# Patient Record
Sex: MALE | Race: WHITE | Employment: OTHER | URBAN - METROPOLITAN AREA
[De-identification: names, ages, dates, MRNs, and addresses within clinical notes are randomized per-mention and may not be internally consistent; named-entity substitution may affect disease eponyms.]

---

## 2017-05-03 ENCOUNTER — TRANSFERRED RECORDS (OUTPATIENT)
Dept: HEALTH INFORMATION MANAGEMENT | Facility: CLINIC | Age: 72
End: 2017-05-03

## 2017-05-08 DIAGNOSIS — I10 ESSENTIAL HYPERTENSION WITH GOAL BLOOD PRESSURE LESS THAN 130/80: ICD-10-CM

## 2017-05-08 DIAGNOSIS — I25.10 CORONARY ARTERY DISEASE INVOLVING NATIVE CORONARY ARTERY OF NATIVE HEART WITHOUT ANGINA PECTORIS: ICD-10-CM

## 2017-05-08 NOTE — TELEPHONE ENCOUNTER
Called and left  for [facility] pharmacy. Patient already has 90 day supply with 3 refills sent on 9/26/16

## 2017-08-21 ENCOUNTER — TRANSFERRED RECORDS (OUTPATIENT)
Dept: HEALTH INFORMATION MANAGEMENT | Facility: CLINIC | Age: 72
End: 2017-08-21

## 2017-08-21 LAB
CHOL/HDLC RATIO - QUEST: 3.5
CHOLEST SERPL-MCNC: 128 MG/DL
HBA1C MFR BLD: 5.7 % (ref 0–5.7)
HDLC SERPL-MCNC: 37 MG/DL
LDLC SERPL CALC-MCNC: 73 MG/DL
TRIGL SERPL-MCNC: 90 MG/DL

## 2017-08-22 ENCOUNTER — TRANSFERRED RECORDS (OUTPATIENT)
Dept: HEALTH INFORMATION MANAGEMENT | Facility: CLINIC | Age: 72
End: 2017-08-22

## 2017-08-22 DIAGNOSIS — E78.2 MIXED HYPERLIPIDEMIA: Primary | ICD-10-CM

## 2017-08-22 DIAGNOSIS — I10 HTN (HYPERTENSION): ICD-10-CM

## 2017-08-23 ENCOUNTER — OFFICE VISIT (OUTPATIENT)
Dept: CARDIOLOGY | Facility: CLINIC | Age: 72
End: 2017-08-23
Attending: INTERNAL MEDICINE
Payer: COMMERCIAL

## 2017-08-23 VITALS
SYSTOLIC BLOOD PRESSURE: 138 MMHG | DIASTOLIC BLOOD PRESSURE: 68 MMHG | HEIGHT: 71 IN | HEART RATE: 64 BPM | WEIGHT: 185.4 LBS | BODY MASS INDEX: 25.96 KG/M2

## 2017-08-23 DIAGNOSIS — I25.10 CORONARY ARTERY DISEASE INVOLVING NATIVE CORONARY ARTERY OF NATIVE HEART WITHOUT ANGINA PECTORIS: ICD-10-CM

## 2017-08-23 DIAGNOSIS — I10 ESSENTIAL HYPERTENSION WITH GOAL BLOOD PRESSURE LESS THAN 130/80: ICD-10-CM

## 2017-08-23 DIAGNOSIS — E78.2 MIXED HYPERLIPIDEMIA: ICD-10-CM

## 2017-08-23 PROCEDURE — 99214 OFFICE O/P EST MOD 30 MIN: CPT | Performed by: INTERNAL MEDICINE

## 2017-08-23 NOTE — PROGRESS NOTES
HPI and Plan:   See dictation    IMPRESSION:   1.  Asymptomatic coronary artery disease.   2.  Controlled hypertension.   3.  Treated hyperlipidemia.   4.  Mild hypertriglyceridemia.   5.  Vague abdominal discomfort likely unrelated to any possibility of having an abdominal aneurysm but an ultrasound ordered.   6.  Mild peripheral vascular arterial disease distally.  I did not order any assessment to this as he is otherwise asymptomatic.           I recommend continuing current treatment plans in the chart.      No orders of the defined types were placed in this encounter.    Orders Placed This Encounter   Medications     ranitidine (ZANTAC) 150 MG tablet     Sig: Take by mouth daily as needed for heartburn     echincea extract capsule     Sig: Take 250 mg by mouth daily as needed     Medications Discontinued During This Encounter   Medication Reason     sildenafil (VIAGRA) 100 MG tablet Stopped by Patient         Encounter Diagnoses   Name Primary?     Coronary artery disease involving native coronary artery of native heart without angina pectoris      Essential hypertension with goal blood pressure less than 130/80      Mixed hyperlipidemia        CURRENT MEDICATIONS:  Current Outpatient Prescriptions   Medication Sig Dispense Refill     ranitidine (ZANTAC) 150 MG tablet Take by mouth daily as needed for heartburn       echincea extract capsule Take 250 mg by mouth daily as needed       rosuvastatin (CRESTOR) 10 MG tablet Take 0.5 tablets (5 mg) by mouth every other day 23 tablet 3     valsartan (DIOVAN) 80 MG tablet Take 1 tablet (80 mg) by mouth daily 90 tablet 3     metoprolol (LOPRESSOR) 50 MG tablet Take 1 tablet (50 mg) by mouth every evening 90 tablet 3     aspirin 81 MG tablet Take 81 mg by mouth every other day       magnesium 100 MG TABS Take 1 tablet by mouth daily       Esomeprazole magnesium (NEXIUM) 20 MG PACK Take 1 tablet by mouth daily as needed        Vitamin D, Cholecalciferol, 1000 UNITS CAPS  Take 1 tablet by mouth daily       fluticasone (FLONASE) 50 MCG/ACT nasal spray daily as needed   11     Omega-3 Fatty Acids (FISH OIL) 1200 MG CAPS Take by mouth daily          ALLERGIES   No Known Allergies    PAST MEDICAL HISTORY:  Past Medical History:   Diagnosis Date     Coronary artery disease      Gastro-oesophageal reflux disease      Hiatal hernia      History of angina      Hyperlipidaemia      Hypertension      Reflux        PAST SURGICAL HISTORY:  Past Surgical History:   Procedure Laterality Date     CORONARY ANGIOGRAPHY ADULT ORDER  1999     HEART CATH, ANGIOPLASTY  1999    Ptca/Stent LAD, d CX     nasal polpectomy         FAMILY HISTORY:  Family History   Problem Relation Age of Onset     Other Cancer Mother      asbestos related cancer     Other Cancer Father      asbestos related cancer     Coronary Artery Disease No family hx of        SOCIAL HISTORY:  Social History     Social History     Marital status:      Spouse name: N/A     Number of children: N/A     Years of education: N/A     Social History Main Topics     Smoking status: Former Smoker     Packs/day: 0.50     Years: 15.00     Types: Cigarettes     Smokeless tobacco: Never Used      Comment: quit 1978     Alcohol use Yes      Comment: 1 per day      Drug use: No     Sexual activity: Not Asked     Other Topics Concern     Parent/Sibling W/ Cabg, Mi Or Angioplasty Before 65f 55m? No     Caffeine Concern Yes     coffee in the morning      Special Diet No     Exercise Yes     mod.     Social History Narrative         Review of Systems:  Skin:  Negative       Eyes:  Negative      ENT:  Negative      Respiratory:  Negative       Cardiovascular:  Negative;palpitations;chest pain;lightheadedness;dizziness;cyanosis;syncope or near-syncope;exercise intolerance;fatigue;edema      Gastroenterology: Positive for heartburn;reflux    Genitourinary:  Positive for nocturia    Musculoskeletal:  Positive for joint pain    Neurologic:  Negative     "  Psychiatric:  Positive for sleep disturbances    Heme/Lymph/Imm:  Positive for easy bruising    Endocrine:  Negative        Physical Exam:  Vitals: /68 (BP Location: Left arm, Cuff Size: Adult Regular)  Pulse 64  Ht 1.803 m (5' 11\")  Wt 84.1 kg (185 lb 6.4 oz)  BMI 25.86 kg/m2    Constitutional:  cooperative;alert and oriented;well developed;well nourished overweight      Skin:  warm and dry to the touch        Head:  normocephalic        Eyes:  pupils equal and round;sclera white        ENT:  no pallor or cyanosis        Neck:  carotid pulses are full and equal bilaterally;no carotid bruit        Chest:  clear to auscultation          Cardiac: regular rhythm;normal S1 and S2;no murmurs, gallops or rubs detected                  Abdomen:  abdomen soft;no masses;non-tender   no Aorta felt,    Vascular: pulses full and equal                                        Extremities and Back:  no deformities, clubbing, cyanosis, erythema observed              Neurological:  affect appropriate, oriented to time, person and place;affect appropriate          Recent Lab Results:  LIPID RESULTS:  Lab Results   Component Value Date    HDL 54 05/22/2013    LDL 98 05/22/2013    TRIG 114 05/22/2013    CHOLHDLRATIO 3.2 05/22/2013       LIVER ENZYME RESULTS:  Lab Results   Component Value Date    ALT 19 12/13/2010       CBC RESULTS:  No results found for: WBC, RBC, HGB, HCT, MCV, MCH, MCHC, RDW, PLT    BMP RESULTS:  No results found for: NA, POTASSIUM, CHLORIDE, CO2, ANIONGAP, GLC, BUN, CR, GFRESTIMATED, GFRESTBLACK, SOPHIE     A1C RESULTS:  No results found for: A1C    INR RESULTS:  No results found for: INR        CC  Nasir Ashley MD  [address]  [place] [address]                  "

## 2017-08-23 NOTE — MR AVS SNAPSHOT
"              After Visit Summary   8/23/2017    Javi Beatty    MRN: [id number]           Patient Information     Date Of Birth          1945        Visit Information        Provider Department      8/23/2017 9:45 AM Nasir Ashley MD [facility]        Today's Diagnoses     Coronary artery disease involving native coronary artery of native heart without angina pectoris        Essential hypertension with goal blood pressure less than 130/80        Mixed hyperlipidemia           Follow-ups after your visit        Additional Services     Follow-Up with Cardiologist                 Future tests that were ordered for you today     Open Future Orders        Priority Expected Expires Ordered    Follow-Up with Cardiologist Routine 8/23/2018 8/24/2018 8/23/2017            Who to contact     If you have questions or need follow up information about today's clinic visit or your schedule please contact [facility] directly at [phone].  Normal or non-critical lab and imaging results will be communicated to you by MyChart, letter or phone within 4 business days after the clinic has received the results. If you do not hear from us within 7 days, please contact the clinic through unamiahart or phone. If you have a critical or abnormal lab result, we will notify you by phone as soon as possible.  Submit refill requests through [facility] or call your pharmacy and they will forward the refill request to us. Please allow 3 business days for your refill to be completed.          Additional Information About Your Visit        MyChart Information     [facility] lets you send messages to your doctor, view your test results, renew your prescriptions, schedule appointments and more. To sign up, go to [email or web address] . Click on \"Log in\" on the left side of the screen, which will take you to the Welcome page. Then click on \"Sign up Now\" " "on the right side of the page.     You will be asked to enter the access code listed below, as well as some personal information. Please follow the directions to create your username and password.     Your access code is: [email or web address]  Expires: 2017 10:27 AM     Your access code will  in 90 days. If you need help or a new code, please call your [facility] clinic or [phone].        Care EveryWhere ID     This is your Care EveryWhere ID. This could be used by other organizations to access your [facility] medical records  [id number]        Your Vitals Were     Pulse Height BMI (Body Mass Index)             64 1.803 m (5' 11\") 25.86 kg/m2          Blood Pressure from Last 3 Encounters:   17 138/68   07/15/16 130/66   05/08/15 138/68    Weight from Last 3 Encounters:   17 84.1 kg (185 lb 6.4 oz)   07/15/16 86.7 kg (191 lb 1.6 oz)   05/08/15 87.7 kg (193 lb 6.4 oz)              We Performed the Following     Follow-Up with Cardiologist        Primary Care Provider Office Phone # Fax #    Shant HUNTER Christo [phone]       [facility] [address] [place] [address]        Equal Access to Services     GABI RICHARDSON : Hadii aad ku hadasho Soomaali, waaxda luqadaha, qaybta kaalmada livier, jeffrey linder. So [facility] [phone].    ATENCIÓN: Si habla español, tiene a yusuf disposición servicios gratuitos de asistencia lingüística. Natalia al [phone].    We comply with applicable federal civil rights laws and [place] laws. We do not discriminate on the basis of race, color, national origin, age, disability sex, sexual orientation or gender identity.            Thank you!     Thank you for choosing [facility] [place]  for your care. Our goal is always to provide you with excellent care. Hearing back from our patients is one way we can continue to improve our services. Please take a few minutes to " complete the written survey that you may receive in the mail after your visit with us. Thank you!             Your Updated Medication List - Protect others around you: Learn how to safely use, store and throw away your medicines at www.disposemymeds.org.          This list is accurate as of: 8/23/17 10:27 AM.  Always use your most recent med list.                   Brand Name Dispense Instructions for use Diagnosis    aspirin 81 MG tablet      Take 81 mg by mouth every other day        echincea extract capsule      Take 250 mg by mouth daily as needed        Fish Oil 1200 MG Caps      Take by mouth daily        fluticasone 50 MCG/ACT spray    FLONASE     daily as needed        magnesium 100 MG Tabs      Take 1 tablet by mouth daily        metoprolol 50 MG tablet    LOPRESSOR    90 tablet    Take 1 tablet (50 mg) by mouth every evening    Coronary artery disease involving native coronary artery of native heart without angina pectoris       nexIUM 20 MG Pack   Generic drug:  Esomeprazole magnesium      Take 1 tablet by mouth daily as needed        rosuvastatin 10 MG tablet    CRESTOR    23 tablet    Take 0.5 tablets (5 mg) by mouth every other day    Coronary artery disease involving native coronary artery of native heart without angina pectoris, Essential hypertension with goal blood pressure less than 130/80       valsartan 80 MG tablet    DIOVAN    90 tablet    Take 1 tablet (80 mg) by mouth daily    Essential hypertension with goal blood pressure less than 130/80       Vitamin D (Cholecalciferol) 1000 UNITS Caps      Take 1 tablet by mouth daily        ZANTAC 150 MG tablet   Generic drug:  ranitidine      Take by mouth daily as needed for heartburn

## 2017-08-23 NOTE — PROGRESS NOTES
2017             Shant Villafuerte MD   [facility]    [address] [place] [address]      RE: Javi Beatty    MRN: [id number]   : 1945      Dear Dr. Villafuerte:       I saw Javi Beatty today.  He is 72.  I have known him now for many years, as far back as .  He is a delightful man, as you know.  He has a cute sense of humor and loves to tell stories.  He is a very bright gentleman.      He just returned from [place].  He went with his wife, who was slowed down by some age-related issues, but he did pretty well.      Apparently, he has been able to tolerate rosuvastatin 5 mg at bedtime, and with that, his last LDL was 98, which simplistically is at goal.  He takes good care of himself.  He is quietly active, and he denies any cardiovascular symptoms.      His current program listed in [facility] and Care Everywhere includes:     1.  Rosuvastatin 5 mg at bedtime nightly.   2.  Valsartan 80 mg a day.   3.  Metoprolol tartrate 50 mg a day.   4.  Aspirin 81 mg every other day.  You had advised him to take it daily.  I reinforced that impression, and he said he would comply.        He also takes omega 3 fatty acids, Flonase, calcium and vitamin D, Nexium and Zantac.      REVIEW OF SYSTEMS:  In Epic, and I endorse the findings.  From a cardiac standpoint, it is entirely negative.  He has a rare episode of reflux/heartburn.  He has some nocturia typical for his age.  He has a bit of achiness of the right hip and right knee, but all in all, these have not slowed him down profoundly.  The remaining review is negative.      PHYSICAL EXAMINATION:   GENERAL:  A well-developed, well-nourished, faintly overweight male at 185 pounds.   VITAL SIGNS:  Blood pressure 138/70, heart rate 64 beats a minute.   HEAD AND NECK:  Normal.  Carotids are equal.  No bruits heard.   HEART:  Regular without gallop, murmur, rub or click.   LUNGS:  Clear.    ABDOMEN:  Soft, mildly overweight, without pain, mass or organomegaly.   EXTREMITIES:  He has +1 dorsalis pedis pulses and no edema.      This very sweet man has no evidence of obvious heart failure or angina.  He has coronary artery disease, having had previous stenting of his LAD and left circumflex back in .  He subsequently has had treated hyperlipidemia and treated hypertension.  He is currently normotensive, asymptomatic, looks well and feels well.  He was delighted with his stability, and I was pleased, also.      He just saw you yesterday, and you renewed all of his medications.  I supported staying on this program indefinitely.      IMPRESSION:   1.  Asymptomatic coronary artery disease.   2.  Post LAD and left circumflex stenting.   3.  Treated hypertension.   4.  Treated hyperlipidemia.      PLAN:  Continue as we are.  If he has problems, let me know.  If you have any questions or concerns, give me a call.      Sincerely,      Anselmo Ashley MD, FACC         ANSELMO ASHLEY MD, FACC             D: 2017 10:27   T: 2017 11:58   MT: andres      Name:     HENRY CHOWDHURY   MRN:      [id number]        Account:      [id number]   :      1945           Service Date: 2017      Document: [id number]

## 2017-08-23 NOTE — LETTER
2017             Shant Villafuerte MD   [facility]    [address] [place] [address]      RE: Javi Beatty    MRN: [id number]   : 1945      Dear Dr. Villafuerte:       I saw Javi Beatty today.  He is 72.  I have known him now for many years, as far back as .  He is a delightful man, as you know.  He has a cute sense of humor and loves to tell stories.  He is a very bright gentleman.      He just returned from [place] [employer] [place]  He went with his wife, who was slowed down by some age-related issues, but he did pretty well.      Apparently, he has been able to tolerate rosuvastatin 5 mg at bedtime, and with that, his last LDL was 98, which simplistically is at goal.  He takes good care of himself.  He is quietly active, and he denies any cardiovascular symptoms.      His current program listed in [facility] and Care Everywhere includes:     1.  Rosuvastatin 5 mg at bedtime nightly.   2.  Valsartan 80 mg a day.   3.  Metoprolol tartrate 50 mg a day.   4.  Aspirin 81 mg every other day.  You had advised him to take it daily.  I reinforced that impression, and he said he would comply.        He also takes omega 3 fatty acids, Flonase, calcium and vitamin D, Nexium and Zantac.      REVIEW OF SYSTEMS:  In Epic, and I endorse the findings.  From a cardiac standpoint, it is entirely negative.  He has a rare episode of reflux/heartburn.  He has some nocturia typical for his age.  He has a bit of achiness of the right hip and right knee, but all in all, these have not slowed him down profoundly.  The remaining review is negative.      PHYSICAL EXAMINATION:   GENERAL:  A well-developed, well-nourished, faintly overweight male at 185 pounds.   VITAL SIGNS:  Blood pressure 138/70, heart rate 64 beats a minute.   HEAD AND NECK:  Normal.  Carotids are equal.  No bruits heard.   HEART:  Regular without gallop, murmur, rub or click.   LUNGS:  Clear.    ABDOMEN:  Soft, mildly overweight, without pain, mass or organomegaly.   EXTREMITIES:  He has +1 dorsalis pedis pulses and no edema.      This very sweet man has no evidence of obvious heart failure or angina.  He has coronary artery disease, having had previous stenting of his LAD and left circumflex back in 1999.  He subsequently has had treated hyperlipidemia and treated hypertension.  He is currently normotensive, asymptomatic, looks well and feels well.  He was delighted with his stability, and I was pleased, also.      He just saw you yesterday, and you renewed all of his medications.  I supported staying on this program indefinitely.      IMPRESSION:   1.  Asymptomatic coronary artery disease.   2.  Post LAD and left circumflex stenting.   3.  Treated hypertension.   4.  Treated hyperlipidemia.      PLAN:  Continue as we are.  If he has problems, let me know.  If you have any questions or concerns, give me a call.      Sincerely,      Nasir Ashley MD, [facility]

## 2017-08-24 DIAGNOSIS — I25.10 CORONARY ARTERY DISEASE INVOLVING NATIVE CORONARY ARTERY OF NATIVE HEART WITHOUT ANGINA PECTORIS: Primary | ICD-10-CM

## 2018-09-13 ENCOUNTER — OFFICE VISIT (OUTPATIENT)
Dept: CARDIOLOGY | Facility: CLINIC | Age: 73
End: 2018-09-13
Payer: COMMERCIAL

## 2018-09-13 VITALS
HEIGHT: 71 IN | HEART RATE: 60 BPM | DIASTOLIC BLOOD PRESSURE: 60 MMHG | SYSTOLIC BLOOD PRESSURE: 124 MMHG | BODY MASS INDEX: 26.43 KG/M2 | WEIGHT: 188.8 LBS

## 2018-09-13 DIAGNOSIS — I10 ESSENTIAL HYPERTENSION: ICD-10-CM

## 2018-09-13 DIAGNOSIS — I25.10 CORONARY ARTERY DISEASE INVOLVING NATIVE CORONARY ARTERY OF NATIVE HEART WITHOUT ANGINA PECTORIS: Primary | ICD-10-CM

## 2018-09-13 DIAGNOSIS — I10 ESSENTIAL HYPERTENSION WITH GOAL BLOOD PRESSURE LESS THAN 130/80: ICD-10-CM

## 2018-09-13 DIAGNOSIS — E78.2 MIXED HYPERLIPIDEMIA: ICD-10-CM

## 2018-09-13 PROCEDURE — 99214 OFFICE O/P EST MOD 30 MIN: CPT | Performed by: INTERNAL MEDICINE

## 2018-09-13 RX ORDER — LOSARTAN POTASSIUM 50 MG/1
50 TABLET ORAL DAILY
COMMUNITY
End: 2018-09-13

## 2018-09-13 RX ORDER — METOPROLOL TARTRATE 50 MG
50 TABLET ORAL EVERY EVENING
Qty: 90 TABLET | Refills: 3 | Status: SHIPPED | OUTPATIENT
Start: 2018-09-13 | End: 2019-11-12

## 2018-09-13 RX ORDER — ROSUVASTATIN CALCIUM 10 MG/1
5 TABLET, COATED ORAL EVERY OTHER DAY
Qty: 23 TABLET | Refills: 3 | Status: SHIPPED | OUTPATIENT
Start: 2018-09-13 | End: 2019-10-21

## 2018-09-13 RX ORDER — LOSARTAN POTASSIUM 50 MG/1
50 TABLET ORAL DAILY
Qty: 90 TABLET | Refills: 3 | Status: SHIPPED | OUTPATIENT
Start: 2018-09-13 | End: 2019-11-18

## 2018-09-13 RX ORDER — NIACINAMIDE 500 MG
500 TABLET ORAL DAILY
COMMUNITY

## 2018-09-13 NOTE — LETTER
9/13/2018    SHUN PRINGLE  [facility] [address] [place] [address]    RE: Javi Barcenas Jaci       Dear Colleague,    I had the pleasure of seeing Javi Beatty in the [facility].    I saw Javi Beatty today.  He is 73.  I have known him now for many years, as far back as 1999.  He is a delightful man, as you know.  He has a cute sense of humor and loves to tell stories.  He is a very bright gentleman.       He just returned from a [employer] river trip through [place].  He went with his wife, who was slowed down by some age-related issues, but he did pretty well.       Apparently, he has been able to tolerate rosuvastatin 5 mg at bedtime, and with that, his last LDL was 98, which simplistically is at goal.  He takes good care of himself.  He is quietly active, and he denies any cardiovascular symptoms.       His current program listed in [facility] and Care Everywhere includes:     1.  Rosuvastatin 5 mg at bedtime nightly.   2.  Valsartan 80 mg a day.   3.  Metoprolol tartrate 50 mg a day.   4.  Aspirin 81 mg every other day.  You had advised him to take it daily.  I reinforced that impression, and he said he would comply.         IMPRESSION:   1.  Asymptomatic coronary artery disease.   2.  Post LAD and left circumflex stenting.   3.  Treated hypertension.   4.  Treated hyperlipidemia.     HPI and Plan:   See dictation  I recommend continuing current treatment plans in the chart.      No orders of the defined types were placed in this encounter.    Orders Placed This Encounter   Medications     losartan (COZAAR) 50 MG tablet     Sig: Take 50 mg by mouth daily     niacinamide 500 MG tablet     Sig: Take 500 mg by mouth daily     Medications Discontinued During This Encounter   Medication Reason     valsartan (DIOVAN) 80 MG tablet Alternate therapy     echincea extract capsule Stopped by Patient         Encounter Diagnoses   Name Primary?      Coronary artery disease involving native coronary artery of native heart without angina pectoris Yes     Essential hypertension      Mixed hyperlipidemia        CURRENT MEDICATIONS:  Current Outpatient Prescriptions   Medication Sig Dispense Refill     aspirin 81 MG tablet Take 81 mg by mouth every other day       Esomeprazole magnesium (NEXIUM) 20 MG PACK Take 1 tablet by mouth daily as needed        fluticasone (FLONASE) 50 MCG/ACT nasal spray daily as needed   11     losartan (COZAAR) 50 MG tablet Take 50 mg by mouth daily       magnesium 100 MG TABS Take 1 tablet by mouth daily       metoprolol (LOPRESSOR) 50 MG tablet Take 1 tablet (50 mg) by mouth every evening 90 tablet 3     niacinamide 500 MG tablet Take 500 mg by mouth daily       Omega-3 Fatty Acids (FISH OIL) 1200 MG CAPS Take by mouth daily        ranitidine (ZANTAC) 150 MG tablet Take by mouth daily as needed for heartburn       rosuvastatin (CRESTOR) 10 MG tablet Take 0.5 tablets (5 mg) by mouth every other day 23 tablet 3     Vitamin D, Cholecalciferol, 1000 UNITS CAPS Take 1 tablet by mouth daily         ALLERGIES     Allergies   Allergen Reactions     Grapeseed Oil [Proanthocyanidin] Swelling     RED GRAPES ONLY-throat swells       PAST MEDICAL HISTORY:  Past Medical History:   Diagnosis Date     Coronary artery disease      Gastro-oesophageal reflux disease      Hiatal hernia      History of angina      Hyperlipidaemia      Hypertension      Reflux        PAST SURGICAL HISTORY:  Past Surgical History:   Procedure Laterality Date     CORONARY ANGIOGRAPHY ADULT ORDER  1999     HEART CATH, ANGIOPLASTY  1999    Ptca/Stent LAD, d CX     nasal polpectomy         FAMILY HISTORY:  Family History   Problem Relation Age of Onset     Other Cancer Mother      asbestos related cancer     Other Cancer Father      asbestos related cancer     Coronary Artery Disease No family hx of        SOCIAL HISTORY:  Social History     Social History     Marital status:  "     Spouse name: N/A     Number of children: N/A     Years of education: N/A     Social History Main Topics     Smoking status: Former Smoker     Packs/day: 0.50     Years: 15.00     Types: Cigarettes     Smokeless tobacco: Never Used      Comment: quit 1978     Alcohol use Yes      Comment: 1 per day      Drug use: No     Sexual activity: Not Asked     Other Topics Concern     Parent/Sibling W/ Cabg, Mi Or Angioplasty Before 65f 55m? No     Caffeine Concern Yes     coffee in the morning      Special Diet No     Exercise Yes     mod.     Social History Narrative         Review of Systems:  Skin:  Negative       Eyes:  Positive for glasses reading  ENT:  Negative      Respiratory:  Negative       Cardiovascular:  Negative      Gastroenterology: Positive for heartburn treated  Genitourinary:  Negative      Musculoskeletal:  Positive for arthritis    Neurologic:  Positive for numbness or tingling of feet    Psychiatric:  Positive for sleep disturbances    Heme/Lymph/Imm:  Positive for allergies red grapes  Endocrine:  Negative        Physical Exam:  Vitals: /60  Pulse 60  Ht 1.803 m (5' 11\")  Wt 85.6 kg (188 lb 12.8 oz)  BMI 26.33 kg/m2    Constitutional:  cooperative;well developed;well nourished;in no acute distress overweight      Skin:  warm and dry to the touch          Head:  normocephalic        Eyes:  pupils equal and round;sclera white        Lymph:      ENT:  no pallor or cyanosis        Neck:  carotid pulses are full and equal bilaterally;no carotid bruit        Respiratory:  clear to auscultation         Cardiac: regular rhythm;normal S1 and S2;no murmurs, gallops or rubs detected                pulses full and equal                                        GI:  abdomen soft;no masses;non-tender   no Aorta felt,    Extremities and Muscular Skeletal:  no deformities, clubbing, cyanosis, erythema observed              Neurological:  affect appropriate        Psych:  oriented to time, person " and place        Recent Lab Results:  LIPID RESULTS:  Lab Results   Component Value Date    CHOL 128 08/21/2017    HDL 37 08/21/2017    LDL 73 08/21/2017    TRIG 90 08/21/2017    CHOLHDLRATIO 3.5 08/21/2017    CHOLHDLRATIO 3.2 05/22/2013       LIVER ENZYME RESULTS:  Lab Results   Component Value Date    ALT 19 12/13/2010       CBC RESULTS:  No results found for: WBC, RBC, HGB, HCT, MCV, MCH, MCHC, RDW, PLT    BMP RESULTS:  No results found for: NA, POTASSIUM, CHLORIDE, CO2, ANIONGAP, GLC, BUN, CR, GFRESTIMATED, GFRESTBLACK, SOPHIE     A1C RESULTS:  Lab Results   Component Value Date    A1C 5.7 08/21/2017       INR RESULTS:  No results found for: INR        CC  Shant Villafuerte  [facility] [address] [place] [address]                      Thank you for allowing me to participate in the care of your patient.      Sincerely,     ANSELMO OLIVARES MD     [facility]    cc:   Shant Villafuerte  [facility] [address] [place] [address]

## 2018-09-13 NOTE — PROGRESS NOTES
I saw Javi Beatty today.  He is 73.  I have known him now for many years, as far back as 1999.  He is a delightful man, as you know.  He has a cute sense of humor and loves to tell stories.  He is a very bright gentleman.       He just returned from a [employer] river trip through [place].  He went with his wife, who was slowed down by some age-related issues, but he did pretty well.       Apparently, he has been able to tolerate rosuvastatin 5 mg at bedtime, and with that, his last LDL was 98, which simplistically is at goal.  He takes good care of himself.  He is quietly active, and he denies any cardiovascular symptoms.       His current program listed in [facility] and Care Everywhere includes:     1.  Rosuvastatin 5 mg at bedtime nightly.   2.  Valsartan 80 mg a day.   3.  Metoprolol tartrate 50 mg a day.   4.  Aspirin 81 mg every other day.  You had advised him to take it daily.  I reinforced that impression, and he said he would comply.         IMPRESSION:   1.  Asymptomatic coronary artery disease.   2.  Post LAD and left circumflex stenting.   3.  Treated hypertension.   4.  Treated hyperlipidemia.     HPI and Plan:   See dictation  I recommend continuing current treatment plans in the chart.      No orders of the defined types were placed in this encounter.    Orders Placed This Encounter   Medications     losartan (COZAAR) 50 MG tablet     Sig: Take 50 mg by mouth daily     niacinamide 500 MG tablet     Sig: Take 500 mg by mouth daily     Medications Discontinued During This Encounter   Medication Reason     valsartan (DIOVAN) 80 MG tablet Alternate therapy     echincea extract capsule Stopped by Patient         Encounter Diagnoses   Name Primary?     Coronary artery disease involving native coronary artery of native heart without angina pectoris Yes     Essential hypertension      Mixed hyperlipidemia        CURRENT MEDICATIONS:  Current Outpatient Prescriptions   Medication Sig Dispense Refill      aspirin 81 MG tablet Take 81 mg by mouth every other day       Esomeprazole magnesium (NEXIUM) 20 MG PACK Take 1 tablet by mouth daily as needed        fluticasone (FLONASE) 50 MCG/ACT nasal spray daily as needed   11     losartan (COZAAR) 50 MG tablet Take 50 mg by mouth daily       magnesium 100 MG TABS Take 1 tablet by mouth daily       metoprolol (LOPRESSOR) 50 MG tablet Take 1 tablet (50 mg) by mouth every evening 90 tablet 3     niacinamide 500 MG tablet Take 500 mg by mouth daily       Omega-3 Fatty Acids (FISH OIL) 1200 MG CAPS Take by mouth daily        ranitidine (ZANTAC) 150 MG tablet Take by mouth daily as needed for heartburn       rosuvastatin (CRESTOR) 10 MG tablet Take 0.5 tablets (5 mg) by mouth every other day 23 tablet 3     Vitamin D, Cholecalciferol, 1000 UNITS CAPS Take 1 tablet by mouth daily         ALLERGIES     Allergies   Allergen Reactions     Grapeseed Oil [Proanthocyanidin] Swelling     RED GRAPES ONLY-throat swells       PAST MEDICAL HISTORY:  Past Medical History:   Diagnosis Date     Coronary artery disease      Gastro-oesophageal reflux disease      Hiatal hernia      History of angina      Hyperlipidaemia      Hypertension      Reflux        PAST SURGICAL HISTORY:  Past Surgical History:   Procedure Laterality Date     CORONARY ANGIOGRAPHY ADULT ORDER  1999     HEART CATH, ANGIOPLASTY  1999    Ptca/Stent LAD, d CX     nasal polpectomy         FAMILY HISTORY:  Family History   Problem Relation Age of Onset     Other Cancer Mother      asbestos related cancer     Other Cancer Father      asbestos related cancer     Coronary Artery Disease No family hx of        SOCIAL HISTORY:  Social History     Social History     Marital status:      Spouse name: N/A     Number of children: N/A     Years of education: N/A     Social History Main Topics     Smoking status: Former Smoker     Packs/day: 0.50     Years: 15.00     Types: Cigarettes     Smokeless tobacco: Never Used      Comment:  "quit 1978     Alcohol use Yes      Comment: 1 per day      Drug use: No     Sexual activity: Not Asked     Other Topics Concern     Parent/Sibling W/ Cabg, Mi Or Angioplasty Before 65f 55m? No     Caffeine Concern Yes     coffee in the morning      Special Diet No     Exercise Yes     mod.     Social History Narrative         Review of Systems:  Skin:  Negative       Eyes:  Positive for glasses reading  ENT:  Negative      Respiratory:  Negative       Cardiovascular:  Negative      Gastroenterology: Positive for heartburn treated  Genitourinary:  Negative      Musculoskeletal:  Positive for arthritis    Neurologic:  Positive for numbness or tingling of feet    Psychiatric:  Positive for sleep disturbances    Heme/Lymph/Imm:  Positive for allergies red grapes  Endocrine:  Negative        Physical Exam:  Vitals: /60  Pulse 60  Ht 1.803 m (5' 11\")  Wt 85.6 kg (188 lb 12.8 oz)  BMI 26.33 kg/m2    Constitutional:  cooperative;well developed;well nourished;in no acute distress overweight      Skin:  warm and dry to the touch          Head:  normocephalic        Eyes:  pupils equal and round;sclera white        Lymph:      ENT:  no pallor or cyanosis        Neck:  carotid pulses are full and equal bilaterally;no carotid bruit        Respiratory:  clear to auscultation         Cardiac: regular rhythm;normal S1 and S2;no murmurs, gallops or rubs detected                pulses full and equal                                        GI:  abdomen soft;no masses;non-tender   no Aorta felt,    Extremities and Muscular Skeletal:  no deformities, clubbing, cyanosis, erythema observed              Neurological:  affect appropriate        Psych:  oriented to time, person and place        Recent Lab Results:  LIPID RESULTS:  Lab Results   Component Value Date    CHOL 128 08/21/2017    HDL 37 08/21/2017    LDL 73 08/21/2017    TRIG 90 08/21/2017    CHOLHDLRATIO 3.5 08/21/2017    CHOLHDLRATIO 3.2 05/22/2013       LIVER ENZYME " RESULTS:  Lab Results   Component Value Date    ALT 19 12/13/2010       CBC RESULTS:  No results found for: WBC, RBC, HGB, HCT, MCV, MCH, MCHC, RDW, PLT    BMP RESULTS:  No results found for: NA, POTASSIUM, CHLORIDE, CO2, ANIONGAP, GLC, BUN, CR, GFRESTIMATED, GFRESTBLACK, SOPHIE     A1C RESULTS:  Lab Results   Component Value Date    A1C 5.7 08/21/2017       INR RESULTS:  No results found for: INR        CC  Shant Villafuerte  [facility]  [address] [place] [address]

## 2018-09-13 NOTE — MR AVS SNAPSHOT
"              After Visit Summary   9/13/2018    Javi Beatty    MRN: [id number]           Patient Information     Date Of Birth          1945        Visit Information        Provider Department      9/13/2018 3:45 PM Nasir Ashley MD [facility]        Today's Diagnoses     Coronary artery disease involving native coronary artery of native heart without angina pectoris    -  1    Essential hypertension        Mixed hyperlipidemia        Essential hypertension with goal blood pressure less than 130/80           Follow-ups after your visit        Additional Services     Follow-Up with Cardiologist                 Future tests that were ordered for you today     Open Future Orders        Priority Expected Expires Ordered    Follow-Up with Cardiologist Routine 9/13/2019 9/14/2019 9/13/2018            Who to contact     If you have questions or need follow up information about today's clinic visit or your schedule please contact [facility] directly at [phone].  Normal or non-critical lab and imaging results will be communicated to you by MyChart, letter or phone within 4 business days after the clinic has received the results. If you do not hear from us within 7 days, please contact the clinic through MyChart or phone. If you have a critical or abnormal lab result, we will notify you by phone as soon as possible.  Submit refill requests through [employer] or call your pharmacy and they will forward the refill request to us. Please allow 3 business days for your refill to be completed.          Additional Information About Your Visit        Care EveryWhere ID     This is your Care EveryWhere ID. This could be used by other organizations to access your [place] medical records  [id number]        Your Vitals Were     Pulse Height BMI (Body Mass Index)             60 1.803 m (5' 11\") 26.33 kg/m2          Blood Pressure from " Last 3 Encounters:   09/13/18 124/60   08/23/17 138/68   07/15/16 130/66    Weight from Last 3 Encounters:   09/13/18 85.6 kg (188 lb 12.8 oz)   08/23/17 84.1 kg (185 lb 6.4 oz)   07/15/16 86.7 kg (191 lb 1.6 oz)                 Today's Medication Changes          These changes are accurate as of 9/13/18  5:04 PM.  If you have any questions, ask your nurse or doctor.               Stop taking these medicines if you haven't already. Please contact your care team if you have questions.     valsartan 80 MG tablet   Commonly known as:  DIOVAN   Stopped by:  Nasir Ashley MD                Where to get your medicines      These medications were sent to [facility] [address] [facility] [address] [facility] [address]  [address] [facility] [address]     Phone:  [phone]     losartan 50 MG tablet    metoprolol tartrate 50 MG tablet    rosuvastatin 10 MG tablet                Primary Care Provider Office Phone # Fax #    Shant Villafuerte [phone]       [facility] [address] [place] [address]        Equal Access to Services     GABI RICHARDSON AH: Hadii harinder ku hadasho Soomaali, waaxda luqadaha, qaybta kaalmada adeegyada, waxay idiin hayodalisn radha linder. So [place] -[phone].    ATENCIÓN: Si habla español, tiene a yusuf disposición servicios gratuitos de asistencia lingüística. Natalia al [phone].    We comply with applicable federal civil rights laws and [place] laws. We do not discriminate on the basis of race, color, national origin, age, disability, sex, sexual orientation, or gender identity.            Thank you!     Thank you for choosing [facility]  for your care. Our goal is always to provide you with excellent care. Hearing back from our patients is one way we can continue to improve our services. Please take a few minutes to complete the written survey that you may receive in the mail after  your visit with us. Thank you!             Your Updated Medication List - Protect others around you: Learn how to safely use, store and throw away your medicines at www.disposemymeds.org.          This list is accurate as of 9/13/18  5:04 PM.  Always use your most recent med list.                   Brand Name Dispense Instructions for use Diagnosis    aspirin 81 MG tablet      Take 81 mg by mouth every other day        fish Oil 1200 MG capsule      Take by mouth daily        fluticasone 50 MCG/ACT spray    FLONASE     daily as needed        losartan 50 MG tablet    COZAAR    90 tablet    Take 1 tablet (50 mg) by mouth daily    Essential hypertension, Coronary artery disease involving native coronary artery of native heart without angina pectoris       magnesium 100 MG Tabs      Take 1 tablet by mouth daily        metoprolol tartrate 50 MG tablet    LOPRESSOR    90 tablet    Take 1 tablet (50 mg) by mouth every evening    Coronary artery disease involving native coronary artery of native heart without angina pectoris       nexIUM 20 MG Pack   Generic drug:  Esomeprazole magnesium      Take 1 tablet by mouth daily as needed        niacinamide 500 MG tablet      Take 500 mg by mouth daily        rosuvastatin 10 MG tablet    CRESTOR    23 tablet    Take 0.5 tablets (5 mg) by mouth every other day    Coronary artery disease involving native coronary artery of native heart without angina pectoris, Essential hypertension with goal blood pressure less than 130/80       Vitamin D (Cholecalciferol) 1000 units Caps      Take 1 tablet by mouth daily        ZANTAC 150 MG tablet   Generic drug:  ranitidine      Take by mouth daily as needed for heartburn

## 2018-09-13 NOTE — LETTER
9/13/2018      SHUN PRINGLE  [facility] [address] [place] [address]      RE: Javi Barcenas Jaci       Dear Colleague,    I had the pleasure of seeing Javi Beatty in the [facility].    Service Date: 09/13/2018      HISTORY OF PRESENT ILLNESS:  I got together with Javi Beatty today, who is 73.  He is one of my favorite people.  I am sure you understand.  He continues to work full-time.  He has had a history of documented coronary disease, having had a stenting of his LAD and left circumflex way back in 1999.  Since that time, he has followed the program noted below and essentially has not had any signs of angina or heart failure.  The same is true for the past year.  He denies chest pain, palpitation, shortness of breath, dizziness or syncope.  He tends to be a little on the rotund side at 188 pounds, but his weight has been stable.      CURRENT MEDICATIONS:   1.  Losartan 50 mg a day.   2.  Metoprolol tartrate 50 mg once daily at bedtime.   3.  Crestor 5 mg at bedtime.   4.  Calcium and vitamin D daily.   5.  Fish oil and niacin daily.   6.  Aspirin 81 mg a day.   7.  Nexium 20 mg a day.      On this program, he feels well.  He is quietly active.  He walks regularly, and as I said, he continues to work full-time.      PHYSICAL EXAMINATION:   VITAL SIGNS:  Blood pressure 120/60, heart rate 60.  He weighs 188 pounds.   NECK:  He had no neck vein distention or bruits at 30 degrees.   HEART:  Regular without gallop or murmur.   LUNGS:  Clear.   ABDOMEN:  Soft, overweight, without organomegaly, mass, pain or guarding.   EXTREMITIES:  +2 dorsalis pedis pulses and no edema.      Mr. Beatty continues to be asymptomatic.  His coronary artery disease is not causing obvious issues or symptoms.  His blood pressure is controlled.  His cholesterol profile is well treated.  His LDLs have been at goal.      He is a delightful man.  I asked to see him in  a year.  I made no changes.  I did not order any tests and did not order any studies.  He should follow up with you in the usual fashion from an Internal Medicine standpoint.      IMPRESSION:   1.  Asymptomatic coronary artery disease.   2.  Treated hypertension.   3.  Treated hyperlipidemia.   4.  Mildly overweight.      PLAN:  As above.      cc:   Shant Villafuerte MD    [facility]    [address] [place]  [address]         ANSELMO OLIVARES MD, [facility]             D: 2018   T: 2018   MT: HILL      Name:     HENRY CHOWDHURY   MRN:      [id number]        Account:      [id number]   :      1945           Service Date: 2018      Document: [id number]         Outpatient Encounter Prescriptions as of 2018   Medication Sig Dispense Refill     aspirin 81 MG tablet Take 81 mg by mouth every other day       Esomeprazole magnesium (NEXIUM) 20 MG PACK Take 1 tablet by mouth daily as needed        fluticasone (FLONASE) 50 MCG/ACT nasal spray daily as needed   11     losartan (COZAAR) 50 MG tablet Take 1 tablet (50 mg) by mouth daily 90 tablet 3     magnesium 100 MG TABS Take 1 tablet by mouth daily       metoprolol tartrate (LOPRESSOR) 50 MG tablet Take 1 tablet (50 mg) by mouth every evening 90 tablet 3     niacinamide 500 MG tablet Take 500 mg by mouth daily       Omega-3 Fatty Acids (FISH OIL) 1200 MG CAPS Take by mouth daily        ranitidine (ZANTAC) 150 MG tablet Take by mouth daily as needed for heartburn       rosuvastatin (CRESTOR) 10 MG tablet Take 0.5 tablets (5 mg) by mouth every other day 23 tablet 3     Vitamin D, Cholecalciferol, 1000 UNITS CAPS Take 1 tablet by mouth daily       [DISCONTINUED] echincea extract capsule Take 250 mg by mouth daily as needed       [DISCONTINUED] losartan (COZAAR) 50 MG tablet Take 50 mg by mouth daily       [DISCONTINUED] metoprolol (LOPRESSOR) 50 MG tablet Take 1 tablet (50 mg) by mouth every evening 90 tablet 3      [DISCONTINUED] rosuvastatin (CRESTOR) 10 MG tablet Take 0.5 tablets (5 mg) by mouth every other day 23 tablet 3     [DISCONTINUED] valsartan (DIOVAN) 80 MG tablet Take 1 tablet (80 mg) by mouth daily 90 tablet 3     No facility-administered encounter medications on file as of 9/13/2018.        Again, thank you for allowing me to participate in the care of your patient.      Sincerely,    ANSELMO OLIVARES MD     [facility]

## 2018-09-14 NOTE — PROGRESS NOTES
Service Date: 09/13/2018      HISTORY OF PRESENT ILLNESS:  I got together with Javi Beatty today, who is 73.  He is one of my favorite people.  I am sure you understand.  He continues to work full-time.  He has had a history of documented coronary disease, having had a stenting of his LAD and left circumflex way back in 1999.  Since that time, he has followed the program noted below and essentially has not had any signs of angina or heart failure.  The same is true for the past year.  He denies chest pain, palpitation, shortness of breath, dizziness or syncope.  He tends to be a little on the rotund side at 188 pounds, but his weight has been stable.      CURRENT MEDICATIONS:   1.  Losartan 50 mg a day.   2.  Metoprolol tartrate 50 mg once daily at bedtime.   3.  Crestor 5 mg at bedtime.   4.  Calcium and vitamin D daily.   5.  Fish oil and niacin daily.   6.  Aspirin 81 mg a day.   7.  Nexium 20 mg a day.      On this program, he feels well.  He is quietly active.  He walks regularly, and as I said, he continues to work full-time.      PHYSICAL EXAMINATION:   VITAL SIGNS:  Blood pressure 120/60, heart rate 60.  He weighs 188 pounds.   NECK:  He had no neck vein distention or bruits at 30 degrees.   HEART:  Regular without gallop or murmur.   LUNGS:  Clear.   ABDOMEN:  Soft, overweight, without organomegaly, mass, pain or guarding.   EXTREMITIES:  +2 dorsalis pedis pulses and no edema.      Mr. Beatty continues to be asymptomatic.  His coronary artery disease is not causing obvious issues or symptoms.  His blood pressure is controlled.  His cholesterol profile is well treated.  His LDLs have been at goal.      He is a delightful man.  I asked to see him in a year.  I made no changes.  I did not order any tests and did not order any studies.  He should follow up with you in the usual fashion from an Internal Medicine standpoint.      IMPRESSION:   1.  Asymptomatic coronary artery disease.   2.  Treated  hypertension.   3.  Treated hyperlipidemia.   4.  Mildly overweight.      PLAN:  As above.      cc:   Shant Villafuerte MD    [facility] [address] [place]  [address]         ANSELMO OLIVARES MD, [facility]             D: 2018   T: 2018   MT: HILL      Name:     HENRY CHOWDHURY   MRN:      [id number]        Account:      [id number]   :      1945           Service Date: 2018      Document: [id number]

## 2019-08-28 LAB
ANION GAP SERPL CALCULATED.3IONS-SCNC: NORMAL MMOL/L
BUN SERPL-MCNC: NORMAL MG/DL
CALCIUM SERPL-MCNC: NORMAL MG/DL
CHLORIDE SERPLBLD-SCNC: NORMAL MMOL/L
CHOLEST SERPL-MCNC: 137 MG/DL (ref 0–199)
CO2 SERPL-SCNC: NORMAL MMOL/L
CREAT SERPL-MCNC: 0.96 MG/DL (ref 0.73–1.18)
GFR SERPL CREATININE-BSD FRML MDRD: >60 ML/MIN/1.73M2 (ref ?–1.73)
GLUCOSE SERPL-MCNC: NORMAL MG/DL (ref 70–99)
HDLC SERPL-MCNC: 44 MG/DL
HEMOGLOBIN: 15.5 G/DL (ref 13.5–17.5)
LDLC SERPL CALC-MCNC: 56 MG/DL
NONHDLC SERPL-MCNC: NORMAL MG/DL
PLATELET # BLD AUTO: 161 10^9/L (ref 150–450)
POTASSIUM SERPL-SCNC: NORMAL MMOL/L
SODIUM SERPL-SCNC: NORMAL MMOL/L
TRIGL SERPL-MCNC: 187 MG/DL
TSH SERPL-ACNC: 1.63 MCU/ML (ref 0.3–4.5)

## 2019-10-02 ENCOUNTER — PRE VISIT (OUTPATIENT)
Dept: CARDIOLOGY | Facility: CLINIC | Age: 74
End: 2019-10-02

## 2019-10-02 DIAGNOSIS — I25.10 CORONARY ARTERY DISEASE INVOLVING NATIVE CORONARY ARTERY OF NATIVE HEART WITHOUT ANGINA PECTORIS: ICD-10-CM

## 2019-10-02 NOTE — TELEPHONE ENCOUNTER
Message left for Anthony to call back. Dr. Malone OV 10-21-19.Patient on Rosuvastatin .   [facility] everywhere Annual OV 8-28-19. Labs entered.

## 2019-10-21 ENCOUNTER — OFFICE VISIT (OUTPATIENT)
Dept: CARDIOLOGY | Facility: CLINIC | Age: 74
End: 2019-10-21
Payer: MEDICARE

## 2019-10-21 VITALS
SYSTOLIC BLOOD PRESSURE: 147 MMHG | DIASTOLIC BLOOD PRESSURE: 81 MMHG | HEART RATE: 44 BPM | HEIGHT: 71 IN | BODY MASS INDEX: 26.04 KG/M2 | WEIGHT: 186 LBS

## 2019-10-21 DIAGNOSIS — I10 ESSENTIAL HYPERTENSION: ICD-10-CM

## 2019-10-21 DIAGNOSIS — I25.10 CORONARY ARTERY DISEASE INVOLVING NATIVE CORONARY ARTERY OF NATIVE HEART WITHOUT ANGINA PECTORIS: Primary | ICD-10-CM

## 2019-10-21 DIAGNOSIS — I10 ESSENTIAL HYPERTENSION WITH GOAL BLOOD PRESSURE LESS THAN 130/80: ICD-10-CM

## 2019-10-21 DIAGNOSIS — E78.2 MIXED HYPERLIPIDEMIA: ICD-10-CM

## 2019-10-21 PROCEDURE — 99214 OFFICE O/P EST MOD 30 MIN: CPT | Performed by: INTERNAL MEDICINE

## 2019-10-21 RX ORDER — ROSUVASTATIN CALCIUM 10 MG/1
5 TABLET, COATED ORAL EVERY OTHER DAY
Qty: 23 TABLET | Refills: 3 | Status: SHIPPED | OUTPATIENT
Start: 2019-10-21

## 2019-10-21 ASSESSMENT — MIFFLIN-ST. JEOR: SCORE: 1595.82

## 2019-10-21 NOTE — LETTER
10/21/2019      SHUN PRINGLE  [facility] [address] [place] [address]      RE: Javi Barcenas Hirenbeto       Dear Colleague,    I had the pleasure of seeing Javi Beatty in the [facility].    Service Date: 10/21/2019      HISTORY OF PRESENT ILLNESS:  Javi is an absolutely delightful 74-year-old gentleman, formerly followed by my partner, Dr. Nasir Ashley, now switching to me.  His past medical history is significant for stenting of his left anterior descending artery and circumflex in 1999, hypertension, hyperlipidemia with glucose intolerance.  He has been incident-free since that time.  Last stress test was a stress nuclear scan in 2014 demonstrating a very small area of ischemia in the mid anterior wall with a fixed inferior defect.  Ejection fraction was 69%.      Javi returns to clinic stating he is feeling fine.  He has no chest, arm, neck, jaw or shoulder discomfort.  No dyspnea on exertion, orthopnea or PND.  No palpitations, lightheadedness, dizziness, syncope or near-syncope.  He notes no side effects with his current medical regimen.      ASSESSMENT AND PLAN:  Javi appears to be doing quite well from a cardiac standpoint without clinical evidence of ischemia, heart failure or significant arrhythmia.      Blood pressure is high today, although this is the first time I have seen him and review of the chart shows the last 4 to be very well controlled.  I am not going to intensify his antihypertensive regimen, but I asked him to watch this for me and call if there should be an issue.      Weight is 186 pounds, which is down about 2 or 3 pounds over the last couple of years.  Body mass index is 25.9.      Fasting lipid profile is excellent.  Total cholesterol is 137, HDL is 44, LDL is 56, triglycerides are 187.  We will continue his statin regimen as is, which is half of a 10 mg pill every other day.      He, unfortunately, has no  regular exercise regimen, and I have encouraged him to do so.      Looking at his fasting lipid profile, only his triglycerides are up.  Looking at glucose, he does have a glucose intolerance.  I emphasized the importance of watching his carbohydrates including his alcohol and encouraged him to not only increase aerobic activity but also resistance activity to try to build up muscle mass to further drive HDL up and triglycerides down and get better control of his glucose.      Electrolytes through the [facility] system appear to be doing well.      I will follow up in 1 year.  Ultimately, I may switch to following him every other year, alternating with my NANCY.         FRANCISCO JAVIER BRITTON MD, [facility]             D: 10/21/2019   T: 10/21/2019   MT: HILL      Name:     HENRY CHOWDHURY   MRN:      [id number]        Account:      [id number]   :      1945           Service Date: 10/21/2019      Document: [id number]         Outpatient Encounter Medications as of 10/21/2019   Medication Sig Dispense Refill     aspirin 81 MG tablet Take 81 mg by mouth daily        Esomeprazole magnesium (NEXIUM) 20 MG PACK Take 1 tablet by mouth daily as needed        fluticasone (FLONASE) 50 MCG/ACT nasal spray daily as needed   11     losartan (COZAAR) 50 MG tablet Take 1 tablet (50 mg) by mouth daily 90 tablet 3     magnesium 100 MG TABS Take 1 tablet by mouth daily       metoprolol tartrate (LOPRESSOR) 50 MG tablet Take 1 tablet (50 mg) by mouth every evening 90 tablet 3     niacinamide 500 MG tablet Take 500 mg by mouth daily       Omega-3 Fatty Acids (FISH OIL) 1200 MG CAPS Take 2,400 mg by mouth daily        rosuvastatin (CRESTOR) 10 MG tablet Take 0.5 tablets (5 mg) by mouth every other day 23 tablet 3     Vitamin D, Cholecalciferol, 1000 UNITS CAPS Take 1 tablet by mouth daily       [DISCONTINUED] ranitidine (ZANTAC) 150 MG tablet Take by mouth daily as needed for heartburn       [DISCONTINUED] rosuvastatin (CRESTOR) 10 MG  tablet Take 0.5 tablets (5 mg) by mouth every other day 23 tablet 3     No facility-administered encounter medications on file as of 10/21/2019.        Again, thank you for allowing me to participate in the care of your patient.      Sincerely,    Jai Malone MD     [facility]

## 2019-10-21 NOTE — PROGRESS NOTES
Service Date: 10/21/2019      HISTORY OF PRESENT ILLNESS:  Javi is an absolutely delightful 74-year-old gentleman, formerly followed by my partner, Dr. Nasir Ashley, now switching to me.  His past medical history is significant for stenting of his left anterior descending artery and circumflex in 1999, hypertension, hyperlipidemia with glucose intolerance.  He has been incident-free since that time.  Last stress test was a stress nuclear scan in 2014 demonstrating a very small area of ischemia in the mid anterior wall with a fixed inferior defect.  Ejection fraction was 69%.      Javi returns to clinic stating he is feeling fine.  He has no chest, arm, neck, jaw or shoulder discomfort.  No dyspnea on exertion, orthopnea or PND.  No palpitations, lightheadedness, dizziness, syncope or near-syncope.  He notes no side effects with his current medical regimen.      ASSESSMENT AND PLAN:  Javi appears to be doing quite well from a cardiac standpoint without clinical evidence of ischemia, heart failure or significant arrhythmia.      Blood pressure is high today, although this is the first time I have seen him and review of the chart shows the last 4 to be very well controlled.  I am not going to intensify his antihypertensive regimen, but I asked him to watch this for me and call if there should be an issue.      Weight is 186 pounds, which is down about 2 or 3 pounds over the last couple of years.  Body mass index is 25.9.      Fasting lipid profile is excellent.  Total cholesterol is 137, HDL is 44, LDL is 56, triglycerides are 187.  We will continue his statin regimen as is, which is half of a 10 mg pill every other day.      He, unfortunately, has no regular exercise regimen, and I have encouraged him to do so.      Looking at his fasting lipid profile, only his triglycerides are up.  Looking at glucose, he does have a glucose intolerance.  I emphasized the importance of watching his carbohydrates including  his alcohol and encouraged him to not only increase aerobic activity but also resistance activity to try to build up muscle mass to further drive HDL up and triglycerides down and get better control of his glucose.      Electrolytes through the [facility] system appear to be doing well.      I will follow up in 1 year.  Ultimately, I may switch to following him every other year, alternating with my NANCY.         FRANCISCO JAVIER BRITTON MD, [facility]             D: 10/21/2019   T: 10/21/2019   MT: HILL      Name:     HENRY CHOWDHURY   MRN:      [id number]        Account:      [id number]   :      1945           Service Date: 10/21/2019      Document: [id number]

## 2019-10-21 NOTE — LETTER
10/21/2019    SHUN PRINGLE  [facility] [address] [place] [address]    RE: Javi Beatty       Dear Colleague,    I had the pleasure of seeing Javi Beatty in the [facility].    HPI and Plan:   See dictation    Orders Placed This Encounter   Procedures     Basic metabolic panel     Lipid Profile     Follow-Up with Cardiologist       Orders Placed This Encounter   Medications     rosuvastatin (CRESTOR) 10 MG tablet     Sig: Take 0.5 tablets (5 mg) by mouth every other day     Dispense:  23 tablet     Refill:  3       Medications Discontinued During This Encounter   Medication Reason     ranitidine (ZANTAC) 150 MG tablet Medication Reconciliation Clean Up     rosuvastatin (CRESTOR) 10 MG tablet Reorder         Encounter Diagnoses   Name Primary?     Coronary artery disease involving native coronary artery of native heart without angina pectoris Yes     Essential hypertension      Mixed hyperlipidemia      Essential hypertension with goal blood pressure less than 130/80        CURRENT MEDICATIONS:  Current Outpatient Medications   Medication Sig Dispense Refill     aspirin 81 MG tablet Take 81 mg by mouth daily        Esomeprazole magnesium (NEXIUM) 20 MG PACK Take 1 tablet by mouth daily as needed        fluticasone (FLONASE) 50 MCG/ACT nasal spray daily as needed   11     losartan (COZAAR) 50 MG tablet Take 1 tablet (50 mg) by mouth daily 90 tablet 3     magnesium 100 MG TABS Take 1 tablet by mouth daily       metoprolol tartrate (LOPRESSOR) 50 MG tablet Take 1 tablet (50 mg) by mouth every evening 90 tablet 3     niacinamide 500 MG tablet Take 500 mg by mouth daily       Omega-3 Fatty Acids (FISH OIL) 1200 MG CAPS Take 2,400 mg by mouth daily        rosuvastatin (CRESTOR) 10 MG tablet Take 0.5 tablets (5 mg) by mouth every other day 23 tablet 3     Vitamin D, Cholecalciferol, 1000 UNITS CAPS Take 1 tablet by mouth daily          ALLERGIES     Allergies   Allergen Reactions     Grapeseed Oil [Proanthocyanidin] Swelling     RED GRAPES ONLY-throat swells       PAST MEDICAL HISTORY:  Past Medical History:   Diagnosis Date     Coronary artery disease      Gastro-oesophageal reflux disease      Hiatal hernia      History of angina      Hyperlipidaemia      Hypertension      Reflux        PAST SURGICAL HISTORY:  Past Surgical History:   Procedure Laterality Date     CORONARY ANGIOGRAPHY ADULT ORDER  1999     HEART CATH, ANGIOPLASTY  1999    Ptca/Stent LAD, d CX     nasal polpectomy         FAMILY HISTORY:  Family History   Problem Relation Age of Onset     Other Cancer Mother         asbestos related cancer     Other Cancer Father         asbestos related cancer     Skin Cancer Brother      Skin Cancer Sister      Skin Cancer Son      Coronary Artery Disease No family hx of        SOCIAL HISTORY:  Social History     Socioeconomic History     Marital status:      Spouse name: None     Number of children: None     Years of education: None     Highest education level: None   Occupational History     None   Social Needs     Financial resource strain: None     Food insecurity:     Worry: None     Inability: None     Transportation needs:     Medical: None     Non-medical: None   Tobacco Use     Smoking status: Former Smoker     Packs/day: 0.50     Years: 15.00     Pack years: 7.50     Types: Cigarettes     Smokeless tobacco: Never Used     Tobacco comment: quit 1978   Substance and Sexual Activity     Alcohol use: Yes     Comment: 1 per day      Drug use: No     Sexual activity: None   Lifestyle     Physical activity:     Days per week: None     Minutes per session: None     Stress: None   Relationships     Social connections:     Talks on phone: None     Gets together: None     Attends Voodoo service: None     Active member of club or organization: None     Attends meetings of clubs or organizations: None     Relationship status: None  "    Intimate partner violence:     Fear of current or ex partner: None     Emotionally abused: None     Physically abused: None     Forced sexual activity: None   Other Topics Concern     Parent/sibling w/ CABG, MI or angioplasty before 65F 55M? No      Service Not Asked     Blood Transfusions Not Asked     Caffeine Concern Yes     Comment: coffee in the morning      Occupational Exposure Not Asked     Hobby Hazards Not Asked     Sleep Concern Not Asked     Stress Concern Not Asked     Weight Concern Not Asked     Special Diet No     Back Care Not Asked     Exercise Yes     Comment: mod.     Bike Helmet Not Asked     Seat Belt Not Asked     Self-Exams Not Asked   Social History Narrative     None       Review of Systems:  Skin:  Negative       Eyes:  Positive for glasses reading  ENT:  Negative vertigo    Respiratory:  Negative       Cardiovascular:  Negative      Gastroenterology: Positive for heartburn treated  Genitourinary:  Positive for nocturia    Musculoskeletal:  Positive for arthritis;nocturnal cramping hamstring, calf, arch of foot get cramping/bubbling at night  Neurologic:  Positive for numbness or tingling of feet    Psychiatric:  Positive for sleep disturbances    Heme/Lymph/Imm:  Positive for allergies red grapes  Endocrine:  Negative        Physical Exam:  Vitals: BP (!) 147/81   Pulse (!) 44   Ht 1.803 m (5' 11\")   Wt 84.4 kg (186 lb)   BMI 25.94 kg/m       Constitutional:  cooperative, alert and oriented, well developed, well nourished, in no acute distress overweight      Skin:  warm and dry to the touch, no apparent skin lesions or masses noted          Head:  normocephalic, no masses or lesions        Eyes:  pupils equal and round, conjunctivae and lids unremarkable, sclera white, no xanthalasma, EOMS intact, no nystagmus        Lymph:      ENT:  no pallor or cyanosis, dentition good        Neck:  carotid pulses are full and equal bilaterally;no carotid bruit        Respiratory:  " normal breath sounds, clear to auscultation, normal A-P diameter, normal symmetry, normal respiratory excursion, no use of accessory muscles         Cardiac: regular rhythm;normal S1 and S2;no murmurs, gallops or rubs detected                pulses full and equal                                        GI:      no Aorta felt,    Extremities and Muscular Skeletal:  no edema;no spinal abnormalities noted;normal muscle strength and tone              Neurological:  no gross motor deficits        Psych:  affect appropriate, oriented to time, person and place        CC  Shant Villafuerte  [facility] [address] [place] [address]                Thank you for allowing me to participate in the care of your patient.      Sincerely,     Jai Malone MD     [facility]    cc:   Shant Villafuerte  [facility] [address] [place] [address]

## 2019-10-21 NOTE — PROGRESS NOTES
HPI and Plan:   See dictation    Orders Placed This Encounter   Procedures     Basic metabolic panel     Lipid Profile     Follow-Up with Cardiologist       Orders Placed This Encounter   Medications     rosuvastatin (CRESTOR) 10 MG tablet     Sig: Take 0.5 tablets (5 mg) by mouth every other day     Dispense:  23 tablet     Refill:  3       Medications Discontinued During This Encounter   Medication Reason     ranitidine (ZANTAC) 150 MG tablet Medication Reconciliation Clean Up     rosuvastatin (CRESTOR) 10 MG tablet Reorder         Encounter Diagnoses   Name Primary?     Coronary artery disease involving native coronary artery of native heart without angina pectoris Yes     Essential hypertension      Mixed hyperlipidemia      Essential hypertension with goal blood pressure less than 130/80        CURRENT MEDICATIONS:  Current Outpatient Medications   Medication Sig Dispense Refill     aspirin 81 MG tablet Take 81 mg by mouth daily        Esomeprazole magnesium (NEXIUM) 20 MG PACK Take 1 tablet by mouth daily as needed        fluticasone (FLONASE) 50 MCG/ACT nasal spray daily as needed   11     losartan (COZAAR) 50 MG tablet Take 1 tablet (50 mg) by mouth daily 90 tablet 3     magnesium 100 MG TABS Take 1 tablet by mouth daily       metoprolol tartrate (LOPRESSOR) 50 MG tablet Take 1 tablet (50 mg) by mouth every evening 90 tablet 3     niacinamide 500 MG tablet Take 500 mg by mouth daily       Omega-3 Fatty Acids (FISH OIL) 1200 MG CAPS Take 2,400 mg by mouth daily        rosuvastatin (CRESTOR) 10 MG tablet Take 0.5 tablets (5 mg) by mouth every other day 23 tablet 3     Vitamin D, Cholecalciferol, 1000 UNITS CAPS Take 1 tablet by mouth daily         ALLERGIES     Allergies   Allergen Reactions     Grapeseed Oil [Proanthocyanidin] Swelling     RED GRAPES ONLY-throat swells       PAST MEDICAL HISTORY:  Past Medical History:   Diagnosis Date     Coronary artery disease      Gastro-oesophageal reflux disease       Hiatal hernia      History of angina      Hyperlipidaemia      Hypertension      Reflux        PAST SURGICAL HISTORY:  Past Surgical History:   Procedure Laterality Date     CORONARY ANGIOGRAPHY ADULT ORDER  1999     HEART CATH, ANGIOPLASTY  1999    Ptca/Stent LAD, d CX     nasal polpectomy         FAMILY HISTORY:  Family History   Problem Relation Age of Onset     Other Cancer Mother         asbestos related cancer     Other Cancer Father         asbestos related cancer     Skin Cancer Brother      Skin Cancer Sister      Skin Cancer Son      Coronary Artery Disease No family hx of        SOCIAL HISTORY:  Social History     Socioeconomic History     Marital status:      Spouse name: None     Number of children: None     Years of education: None     Highest education level: None   Occupational History     None   Social Needs     Financial resource strain: None     Food insecurity:     Worry: None     Inability: None     Transportation needs:     Medical: None     Non-medical: None   Tobacco Use     Smoking status: Former Smoker     Packs/day: 0.50     Years: 15.00     Pack years: 7.50     Types: Cigarettes     Smokeless tobacco: Never Used     Tobacco comment: quit 1978   Substance and Sexual Activity     Alcohol use: Yes     Comment: 1 per day      Drug use: No     Sexual activity: None   Lifestyle     Physical activity:     Days per week: None     Minutes per session: None     Stress: None   Relationships     Social connections:     Talks on phone: None     Gets together: None     Attends Worship service: None     Active member of club or organization: None     Attends meetings of clubs or organizations: None     Relationship status: None     Intimate partner violence:     Fear of current or ex partner: None     Emotionally abused: None     Physically abused: None     Forced sexual activity: None   Other Topics Concern     Parent/sibling w/ CABG, MI or angioplasty before 65F 55M? No      Service  "Not Asked     Blood Transfusions Not Asked     Caffeine Concern Yes     Comment: coffee in the morning      Occupational Exposure Not Asked     Hobby Hazards Not Asked     Sleep Concern Not Asked     Stress Concern Not Asked     Weight Concern Not Asked     Special Diet No     Back Care Not Asked     Exercise Yes     Comment: mod.     Bike Helmet Not Asked     Seat Belt Not Asked     Self-Exams Not Asked   Social History Narrative     None       Review of Systems:  Skin:  Negative       Eyes:  Positive for glasses reading  ENT:  Negative vertigo    Respiratory:  Negative       Cardiovascular:  Negative      Gastroenterology: Positive for heartburn treated  Genitourinary:  Positive for nocturia    Musculoskeletal:  Positive for arthritis;nocturnal cramping hamstring, calf, arch of foot get cramping/bubbling at night  Neurologic:  Positive for numbness or tingling of feet    Psychiatric:  Positive for sleep disturbances    Heme/Lymph/Imm:  Positive for allergies red grapes  Endocrine:  Negative        Physical Exam:  Vitals: BP (!) 147/81   Pulse (!) 44   Ht 1.803 m (5' 11\")   Wt 84.4 kg (186 lb)   BMI 25.94 kg/m      Constitutional:  cooperative, alert and oriented, well developed, well nourished, in no acute distress overweight      Skin:  warm and dry to the touch, no apparent skin lesions or masses noted          Head:  normocephalic, no masses or lesions        Eyes:  pupils equal and round, conjunctivae and lids unremarkable, sclera white, no xanthalasma, EOMS intact, no nystagmus        Lymph:      ENT:  no pallor or cyanosis, dentition good        Neck:  carotid pulses are full and equal bilaterally;no carotid bruit        Respiratory:  normal breath sounds, clear to auscultation, normal A-P diameter, normal symmetry, normal respiratory excursion, no use of accessory muscles         Cardiac: regular rhythm;normal S1 and S2;no murmurs, gallops or rubs detected                pulses full and equal            "                             GI:      no Aorta felt,    Extremities and Muscular Skeletal:  no edema;no spinal abnormalities noted;normal muscle strength and tone              Neurological:  no gross motor deficits        Psych:  affect appropriate, oriented to time, person and place        CC  Shant Villafuerte  [facility]  [address] [place] [address]

## 2019-11-12 DIAGNOSIS — I25.10 CORONARY ARTERY DISEASE INVOLVING NATIVE CORONARY ARTERY OF NATIVE HEART WITHOUT ANGINA PECTORIS: ICD-10-CM

## 2019-11-12 RX ORDER — METOPROLOL TARTRATE 50 MG
50 TABLET ORAL EVERY EVENING
Qty: 90 TABLET | Refills: 3 | Status: SHIPPED | OUTPATIENT
Start: 2019-11-12

## 2019-11-18 DIAGNOSIS — I25.10 CORONARY ARTERY DISEASE INVOLVING NATIVE CORONARY ARTERY OF NATIVE HEART WITHOUT ANGINA PECTORIS: ICD-10-CM

## 2019-11-18 DIAGNOSIS — I10 ESSENTIAL HYPERTENSION: ICD-10-CM

## 2019-11-18 RX ORDER — LOSARTAN POTASSIUM 50 MG/1
50 TABLET ORAL DAILY
Qty: 90 TABLET | Refills: 3 | Status: SHIPPED | OUTPATIENT
Start: 2019-11-18

## 2021-01-20 ENCOUNTER — DOCUMENTATION ONLY (OUTPATIENT)
Dept: CARDIOLOGY | Facility: CLINIC | Age: 76
End: 2021-01-20

## 2021-01-20 DIAGNOSIS — I25.10 CORONARY ARTERY DISEASE INVOLVING NATIVE CORONARY ARTERY OF NATIVE HEART WITHOUT ANGINA PECTORIS: Primary | ICD-10-CM

## 2021-04-20 ENCOUNTER — TELEPHONE (OUTPATIENT)
Dept: CARDIOLOGY | Facility: CLINIC | Age: 76
End: 2021-04-20

## 2021-04-20 NOTE — TELEPHONE ENCOUNTER
----- Message from Jovita Villafuerte sent at 4/20/2021  9:04 AM CDT -----  Regarding: No longer having service w/ us, Faisal patient  [place],      I just wanted to let you know that this patient is no longer doing service w/ us, advised he found a new Cardiologist somewhere else, thanks.        Thanks,  Jovita      Cardiology orders removed.

## 2023-09-07 ENCOUNTER — PATIENT OUTREACH (OUTPATIENT)
Dept: UROLOGY | Facility: CLINIC | Age: 78
End: 2023-09-07
Payer: MEDICARE

## 2023-09-07 NOTE — TELEPHONE ENCOUNTER
Pt referred from Dr. Ackerman from [facility] for [facility] procedure. Pt has [employer] insurance and wears a da silva that he is eager to live without. Per pt, he did call his health insurance rep and weigh the options and elected to follow with [facility] for consult, which is within his network, and is scheduled for sometime next week.     SETH Howard  Care Coordinator  [phone]